# Patient Record
Sex: FEMALE | Race: OTHER | HISPANIC OR LATINO | URBAN - METROPOLITAN AREA
[De-identification: names, ages, dates, MRNs, and addresses within clinical notes are randomized per-mention and may not be internally consistent; named-entity substitution may affect disease eponyms.]

---

## 2019-10-29 ENCOUNTER — EMERGENCY (EMERGENCY)
Facility: HOSPITAL | Age: 29
LOS: 0 days | Discharge: HOME | End: 2019-10-29
Attending: EMERGENCY MEDICINE
Payer: MEDICAID

## 2019-10-29 VITALS
DIASTOLIC BLOOD PRESSURE: 79 MMHG | TEMPERATURE: 98 F | RESPIRATION RATE: 18 BRPM | SYSTOLIC BLOOD PRESSURE: 121 MMHG | OXYGEN SATURATION: 98 % | HEART RATE: 67 BPM | HEIGHT: 63 IN | WEIGHT: 134.04 LBS

## 2019-10-29 DIAGNOSIS — R35.0 FREQUENCY OF MICTURITION: ICD-10-CM

## 2019-10-29 DIAGNOSIS — Z90.49 ACQUIRED ABSENCE OF OTHER SPECIFIED PARTS OF DIGESTIVE TRACT: Chronic | ICD-10-CM

## 2019-10-29 DIAGNOSIS — Z87.440 PERSONAL HISTORY OF URINARY (TRACT) INFECTIONS: ICD-10-CM

## 2019-10-29 DIAGNOSIS — N39.0 URINARY TRACT INFECTION, SITE NOT SPECIFIED: ICD-10-CM

## 2019-10-29 DIAGNOSIS — R30.0 DYSURIA: ICD-10-CM

## 2019-10-29 LAB
APPEARANCE UR: ABNORMAL
BILIRUB UR-MCNC: NEGATIVE — SIGNIFICANT CHANGE UP
COLOR SPEC: YELLOW — SIGNIFICANT CHANGE UP
DIFF PNL FLD: ABNORMAL
GLUCOSE UR QL: NEGATIVE MG/DL — SIGNIFICANT CHANGE UP
KETONES UR-MCNC: NEGATIVE — SIGNIFICANT CHANGE UP
LEUKOCYTE ESTERASE UR-ACNC: ABNORMAL
NITRITE UR-MCNC: NEGATIVE — SIGNIFICANT CHANGE UP
PH UR: 6 — SIGNIFICANT CHANGE UP (ref 5–8)
PROT UR-MCNC: 30 MG/DL
SP GR SPEC: 1.01 — SIGNIFICANT CHANGE UP (ref 1.01–1.02)
UROBILINOGEN FLD QL: NEGATIVE MG/DL — SIGNIFICANT CHANGE UP

## 2019-10-29 PROCEDURE — 81025 URINE PREGNANCY TEST: CPT

## 2019-10-29 PROCEDURE — 87186 SC STD MICRODIL/AGAR DIL: CPT

## 2019-10-29 PROCEDURE — 87086 URINE CULTURE/COLONY COUNT: CPT

## 2019-10-29 PROCEDURE — 81001 URINALYSIS AUTO W/SCOPE: CPT

## 2019-10-29 PROCEDURE — 99283 EMERGENCY DEPT VISIT LOW MDM: CPT

## 2019-10-29 RX ORDER — NITROFURANTOIN MACROCRYSTAL 50 MG
100 CAPSULE ORAL ONCE
Refills: 0 | Status: COMPLETED | OUTPATIENT
Start: 2019-10-29 | End: 2019-10-29

## 2019-10-29 RX ORDER — NITROFURANTOIN MACROCRYSTAL 50 MG
1 CAPSULE ORAL
Qty: 14 | Refills: 0
Start: 2019-10-29 | End: 2019-11-04

## 2019-10-29 RX ADMIN — Medication 100 MILLIGRAM(S): at 06:53

## 2019-10-29 NOTE — ED ADULT NURSE NOTE - OBJECTIVE STATEMENT
Pt presents to ED with c/o urinary frequency, urgency, and painful urination, and abdominal distention x 2days.  Pt is afebrile and denies any other complaints.

## 2019-10-29 NOTE — ED ADULT NURSE NOTE - NSIMPLEMENTINTERV_GEN_ALL_ED
Implemented All Universal Safety Interventions:  [place] to call system. Call bell, personal items and telephone within reach. Instruct patient to call for assistance. Room bathroom lighting operational. Non-slip footwear when patient is off stretcher. Physically safe environment: no spills, clutter or unnecessary equipment. Stretcher in lowest position, wheels locked, appropriate side rails in place.

## 2019-10-29 NOTE — ED PROVIDER NOTE - OBJECTIVE STATEMENT
- [id number] used.  30 yo pt presents with pain with urination and increased frequency.  Started 2 days ago.  Pt has had UTI in past and this feels similar.  No fever.  No travel, no sick contact, no hx of STD.  PSH for .  Nothing makes symptoms better.

## 2019-10-29 NOTE — ED PROVIDER NOTE - PATIENT PORTAL LINK FT
You can access the FollowMyHealth Patient Portal offered by [facility] by registering at the following website: [email or web address]. By joining [employer]’s FollowMyHealth portal, you will also be able to view your health information using other applications (apps) compatible with our system.

## 2019-10-31 NOTE — ED POST DISCHARGE NOTE - RESULT SUMMARY
Urine Cx with >100,000 gram neg rods (E Coli). Patient had been d/c home on macrobid. Culture indicates sensitivity S<=32 for macrobid, although not for use in pyelo. Patient charting indicated UTI, no clinical concern for pyelo mentioned at that time.  Tanmay Calvin MD, PGY3 Emergency Medicine

## 2019-10-31 NOTE — ED POST DISCHARGE NOTE - OTHER COMMUNICATION
UC shows sensitivity to macrobid (which pt was rx from the ER). ED treatment appropriate. -Delvis Bonilla PA-C

## 2024-10-07 NOTE — ED ADULT NURSE NOTE - PAIN: PRESENCE, MLM
Chief complaint:   Chief Complaint   Patient presents with    Sore Throat     My throat is very sore and I have white spots all over. 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 - Entered by patient       Vitals:  Visit Vitals  /77   Pulse 98   Temp 98.8 °F (37.1 °C)   Resp 20   LMP 06/13/2024   SpO2 98%       HISTORY OF PRESENT ILLNESS     HPI  Sore throat with exudate present for one day. 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   Afebrile.  No OTC remedies attempted.   Her daughter is being seen for the same.  Denies cough, ear pain, body aches or concerns of covid  Other significant problems:  Patient Active Problem List    Diagnosis Date Noted    Miscarriage (CMD) 07/30/2024     Priority: Low    Generalized anxiety disorder 08/22/2022     Priority: Low    Migraine without aura and without status migrainosus, not intractable 08/22/2022     Priority: Low    Non-seasonal allergic rhinitis 08/22/2022     Priority: Low    Reactive airway disease without complication (CMD) 06/27/2013     Priority: Low       PAST MEDICAL, FAMILY AND SOCIAL HISTORY     Medications:  Current Outpatient Medications   Medication Sig Dispense Refill    SUMAtriptan (IMITREX) 25 MG tablet Take 1 tablet by mouth daily as needed for Migraine. Take 1 tablet by mouth at onset of migraine. May repeat after 2 hours if needed. 12 tablet 3    escitalopram (LEXAPRO) 20 MG tablet TAKE 1 TABLET BY MOUTH DAILY 90 tablet 3    albuterol (ProAir HFA) 108 (90 Base) MCG/ACT inhaler Inhale 2 puffs into the lungs every 4 hours as needed for Shortness of Breath or Wheezing. 1 each 0    EPINEPHrine 0.3 MG/0.3ML auto-injector Inject 0.3 mLs into the muscle 1 time as needed for Anaphylaxis. 1 each 0    cetirizine (ZyrTEC) 10 MG tablet Take 10 mg by mouth daily.      VITAMIN D, ERGOCALCIFEROL, PO       Prenatal Vit-Fe Fumarate-FA (MULTIVITAMIN & MINERAL W/FOLIC ACID- PRENATAL) 27-1 MG Tab Take 1 tablet by mouth daily.       No current facility-administered  medications for this visit.       Allergies:  ALLERGIES:   Allergen Reactions    Legume    (Food) RASH     Positive skin test    Nuts   (Food) RASH     Positive skin test       Past Medical  History/Surgeries:  Past Medical History:   Diagnosis Date    Migraine without aura     NO HX OF     Ca, Htn, DM, CAD, CVA, DVT, liver disease, thyroid disease or migraine.    RAD (reactive airway disease) (CMD)     allergy induced       Past Surgical History:   Procedure Laterality Date    Appendectomy  07-    [place] tooth extraction         Family History:  Family History   Problem Relation Age of Onset    Anxiety disorder Mother     Hypertension Father     Colon Polyps Father         pre cancerous    Patient is unaware of any medical problems Sister     Diabetes Maternal Grandmother     Congestive Heart Failure Maternal Grandmother     Diabetes Maternal Grandfather     Cancer, Prostate Maternal Grandfather     Dementia/Alzheimers Maternal Grandfather     Cancer, Skin Paternal Grandmother     Cancer, Lung Paternal Grandfather         metastatic; smoker    Diabetes Maternal Aunt     Cancer, Breast Neg Hx         no family history    Cancer, Colon Neg Hx         no family history    Cancer, Ovarian Neg Hx         no family history    Cancer, Endometrial Neg Hx         no family history       Social History:  Social History     Tobacco Use    Smoking status: Never    Smokeless tobacco: Never   Substance Use Topics    Alcohol use: Yes     Alcohol/week: 0.0 standard drinks of alcohol     Comment: 1 per month. occasional, social..        REVIEW OF SYSTEMS     Review of Systems  See HPI  PHYSICAL EXAM     Physical Exam  Vitals and nursing note reviewed.   Constitutional:       General: She is not in acute distress.     Appearance: Normal appearance. She is not ill-appearing, toxic-appearing or diaphoretic.   HENT:      Head: Normocephalic.      Right Ear: Tympanic membrane, ear canal and external ear normal. There is no  impacted cerumen.      Left Ear: Tympanic membrane, ear canal and external ear normal. There is no impacted cerumen.      Nose: No congestion or rhinorrhea.      Mouth/Throat:      Mouth: Mucous membranes are moist.      Pharynx: Posterior oropharyngeal erythema present. No oropharyngeal exudate.      Comments: Tonsillitis, 1+, airway is normal. Tonsils are cryptic, no exudate. Drainage posterior.      Neck: Normal range of motion and neck supple. No rigidity.   Eyes:      General:         Right eye: No discharge.         Left eye: No discharge.   Cardiovascular:      Rate and Rhythm: Normal rate and regular rhythm.      Heart sounds: Normal heart sounds.   Pulmonary:      Effort: Pulmonary effort is normal. No respiratory distress.      Breath sounds: Normal breath sounds. No stridor. No wheezing, rhonchi or rales.   Chest:      Chest wall: No tenderness.   Musculoskeletal:         General: Normal range of motion.   Lymphadenopathy:      Cervical: Cervical adenopathy present.   Skin:     General: Skin is warm and dry.      Coloration: Skin is not pale.   Neurological:      General: No focal deficit present.      Mental Status: She is alert and oriented to person, place, and time.      Sensory: No sensory deficit.      Motor: No weakness.      Coordination: Coordination normal.      Gait: Gait normal.   Psychiatric:         Mood and Affect: Mood normal.         Thought Content: Thought content normal.         Judgment: Judgment normal.         ASSESSMENT/PLAN     Encounter Diagnoses   Name Primary?    Sore throat Yes    Acute tonsillitis, unspecified etiology      Rapid strep: negative, notified patient.  Supportive measures addressed  All questions addressed  See AVS for details  Follow up with PCP prn   complains of pain/discomfort